# Patient Record
Sex: MALE | Race: WHITE | NOT HISPANIC OR LATINO | URBAN - METROPOLITAN AREA
[De-identification: names, ages, dates, MRNs, and addresses within clinical notes are randomized per-mention and may not be internally consistent; named-entity substitution may affect disease eponyms.]

---

## 2023-07-17 ENCOUNTER — OFFICE VISIT (OUTPATIENT)
Dept: PRIMARY CARE | Facility: CLINIC | Age: 33
End: 2023-07-17
Payer: COMMERCIAL

## 2023-07-17 VITALS
DIASTOLIC BLOOD PRESSURE: 86 MMHG | BODY MASS INDEX: 36.4 KG/M2 | SYSTOLIC BLOOD PRESSURE: 142 MMHG | HEIGHT: 74 IN | WEIGHT: 283.6 LBS | HEART RATE: 84 BPM

## 2023-07-17 DIAGNOSIS — Z00.00 HEALTHCARE MAINTENANCE: Primary | ICD-10-CM

## 2023-07-17 DIAGNOSIS — K52.9 CHRONIC DIARRHEA: ICD-10-CM

## 2023-07-17 DIAGNOSIS — R03.0 ELEVATED BLOOD PRESSURE READING: ICD-10-CM

## 2023-07-17 PROBLEM — N52.9 MALE ERECTILE DISORDER: Status: ACTIVE | Noted: 2023-07-17

## 2023-07-17 PROBLEM — R53.83 FATIGUE: Status: ACTIVE | Noted: 2023-07-17

## 2023-07-17 PROCEDURE — 99203 OFFICE O/P NEW LOW 30 MIN: CPT | Performed by: STUDENT IN AN ORGANIZED HEALTH CARE EDUCATION/TRAINING PROGRAM

## 2023-07-17 PROCEDURE — 99385 PREV VISIT NEW AGE 18-39: CPT | Performed by: STUDENT IN AN ORGANIZED HEALTH CARE EDUCATION/TRAINING PROGRAM

## 2023-07-17 NOTE — PROGRESS NOTES
"Subjective   Patient ID: Homar Blackman is a 33 y.o. male who presents for Chronic Diarrhea.  Today he is accompanied by alone.     HPI  1. Encounter for preventive health examination  Overall patient is doing well.   Immunization: Tdap 2020   COVID-19 vaccine (Pfizer) #1: 2021 #2: 2021  Colon Cancer Screening: No family history  Diet: healthy  Exercise: 5-6 times /week  Tobacco: Chews tobacco. Started again 1 year ago.  EtOH: Rarely Socially    Denies any chest pain, palpitations, shortness of breath, cough, nausea, vomiting, or any other acute illnesses/complaints     2. Hx of Elevated blood pressure reading  /86 today  Hx of blood pressure 160/90 and repeat of 152/80  Family History of heart disease in his father  Denies any cardiopulmonary symptoms    3. Chronic diarrhea ddx: IBS  Present for 2-3yrs. Occurs in the morning. Associated with cramping but no overt stomach pains.   Pt has tried changes in diet, fiber, probiotics  No family history of IBS or IBD. No hx of CRC.   Denies solid stools and constipation.  Denies Nausea and vomiting.  Does not associate diarrhea with lactose intolerance.  Has not tried gluten elimination.      No current outpatient medications on file prior to visit.     No current facility-administered medications on file prior to visit.        No Known Allergies    Immunization History   Administered Date(s) Administered    Pfizer Purple Cap SARS-CoV-2 04/20/2021, 05/12/2021    Tdap 02/03/2020         Review of Systems  All pertinent positive symptoms are included in the history of present illness.  All other systems have been reviewed and are negative and noncontributory to this patient's current ailments.     Objective   /86 (BP Location: Left arm, Patient Position: Sitting, BP Cuff Size: Large adult)   Pulse 84   Ht 1.88 m (6' 2\")   Wt 129 kg (283 lb 9.6 oz)   BMI 36.41 kg/m²   BSA: 2.6 meters squared      Physical Exam  CONSTITUTIONAL - well nourished, well developed, " looks like stated age, in no acute distress, not ill-appearing, and not tired appearing  SKIN - normal skin color and pigmentation, normal skin turgor without rash, lesions, or nodules visualized  HEAD - no trauma, normocephalic  EYES - pupils are equal and reactive to light, extraocular muscles are intact, and normal external exam  ENT - TM's intact, no injection, no signs of infection, uvula midline, normal tongue movement and throat normal, no exudate, nasal passage without discharge and patent  NECK - supple without rigidity, no neck mass was observed,  CHEST - clear to auscultation, no wheezing, no crackles and no rales, good effort  CARDIAC - regular rate and regular rhythm, no skipped beats, no murmur  ABDOMEN - no organomegaly, soft, nontender, nondistended, normal bowel sounds, no guarding/rebound/rigidity  EXTREMITIES - no edema, no deformities  NEUROLOGICAL - normal gait, normal balance, normal motor, no ataxia; alert, oriented and no focal signs  PSYCHIATRIC - alert, pleasant and cordial, age-appropriate  IMMUNOLOGIC - no cervical lymphadenopathy     Assessment/Plan   1. Encounter for preventive health examination  Complete history and physical examination was performed  Requisition for fasting blood work was given to you today. Please follow up with any  lab at your convenience and we will reach out to you with recommendations and results.     2. Hx of Elevated blood pressure reading  /86 today  Please take Blood pressure readings at home for the next week and reach out if higher than 120/80 for multiple days.    3. Chronic diarrhea ddx: IBS  We have send blood work and stool studies to determine etiology of chronic diarrhea.   We have also given you a requisition for gastroenterologist, please reach out to them as well and make an appointment at your convenience.   We will reach out with lab test results as they come back.   We will continue to follow.  Please reach out to our office or the  emergency dept if stool becomes bloody or if you have symptoms of light headedness.    Thank you for allowing us to be apart of your care team.

## 2023-07-27 ENCOUNTER — LAB (OUTPATIENT)
Dept: LAB | Facility: LAB | Age: 33
End: 2023-07-27
Payer: COMMERCIAL

## 2023-07-27 DIAGNOSIS — K52.9 CHRONIC DIARRHEA: ICD-10-CM

## 2023-07-27 DIAGNOSIS — Z00.00 HEALTHCARE MAINTENANCE: ICD-10-CM

## 2023-07-27 LAB
ALANINE AMINOTRANSFERASE (SGPT) (U/L) IN SER/PLAS: 21 U/L (ref 10–52)
ALBUMIN (G/DL) IN SER/PLAS: 4.2 G/DL (ref 3.4–5)
ALKALINE PHOSPHATASE (U/L) IN SER/PLAS: 67 U/L (ref 33–120)
ANION GAP IN SER/PLAS: 12 MMOL/L (ref 10–20)
ASPARTATE AMINOTRANSFERASE (SGOT) (U/L) IN SER/PLAS: 17 U/L (ref 9–39)
BASOPHILS (10*3/UL) IN BLOOD BY AUTOMATED COUNT: 0.08 X10E9/L (ref 0–0.1)
BASOPHILS/100 LEUKOCYTES IN BLOOD BY AUTOMATED COUNT: 0.8 % (ref 0–2)
BILIRUBIN TOTAL (MG/DL) IN SER/PLAS: 0.6 MG/DL (ref 0–1.2)
CALCIUM (MG/DL) IN SER/PLAS: 8.9 MG/DL (ref 8.6–10.3)
CARBON DIOXIDE, TOTAL (MMOL/L) IN SER/PLAS: 25 MMOL/L (ref 21–32)
CHLORIDE (MMOL/L) IN SER/PLAS: 109 MMOL/L (ref 98–107)
CHOLESTEROL (MG/DL) IN SER/PLAS: 166 MG/DL (ref 0–199)
CHOLESTEROL IN HDL (MG/DL) IN SER/PLAS: 42.5 MG/DL
CHOLESTEROL/HDL RATIO: 3.9
CREATININE (MG/DL) IN SER/PLAS: 1.16 MG/DL (ref 0.5–1.3)
DEAMIDATED GLIADIN PEPTIDE IGA: 3 U/ML (ref 0–14)
DEAMIDATED GLIADIN PEPTIDE IGG: <1 U/ML (ref 0–14)
EOSINOPHILS (10*3/UL) IN BLOOD BY AUTOMATED COUNT: 0.27 X10E9/L (ref 0–0.7)
EOSINOPHILS/100 LEUKOCYTES IN BLOOD BY AUTOMATED COUNT: 2.5 % (ref 0–6)
ERYTHROCYTE DISTRIBUTION WIDTH (RATIO) BY AUTOMATED COUNT: 13.3 % (ref 11.5–14.5)
ERYTHROCYTE MEAN CORPUSCULAR HEMOGLOBIN CONCENTRATION (G/DL) BY AUTOMATED: 33 G/DL (ref 32–36)
ERYTHROCYTE MEAN CORPUSCULAR VOLUME (FL) BY AUTOMATED COUNT: 94 FL (ref 80–100)
ERYTHROCYTES (10*6/UL) IN BLOOD BY AUTOMATED COUNT: 4.83 X10E12/L (ref 4.5–5.9)
ESTIMATED AVERAGE GLUCOSE FOR HBA1C: 114 MG/DL
GFR MALE: 85 ML/MIN/1.73M2
GLUCOSE (MG/DL) IN SER/PLAS: 89 MG/DL (ref 74–99)
HEMATOCRIT (%) IN BLOOD BY AUTOMATED COUNT: 45.4 % (ref 41–52)
HEMOGLOBIN (G/DL) IN BLOOD: 15 G/DL (ref 13.5–17.5)
HEMOGLOBIN A1C/HEMOGLOBIN TOTAL IN BLOOD: 5.6 %
IMMATURE GRANULOCYTES/100 LEUKOCYTES IN BLOOD BY AUTOMATED COUNT: 0.4 % (ref 0–0.9)
LDL: 105 MG/DL (ref 0–99)
LEUKOCYTES (10*3/UL) IN BLOOD BY AUTOMATED COUNT: 10.7 X10E9/L (ref 4.4–11.3)
LYMPHOCYTES (10*3/UL) IN BLOOD BY AUTOMATED COUNT: 2.01 X10E9/L (ref 1.2–4.8)
LYMPHOCYTES/100 LEUKOCYTES IN BLOOD BY AUTOMATED COUNT: 18.9 % (ref 13–44)
MONOCYTES (10*3/UL) IN BLOOD BY AUTOMATED COUNT: 0.71 X10E9/L (ref 0.1–1)
MONOCYTES/100 LEUKOCYTES IN BLOOD BY AUTOMATED COUNT: 6.7 % (ref 2–10)
NEUTROPHILS (10*3/UL) IN BLOOD BY AUTOMATED COUNT: 7.54 X10E9/L (ref 1.2–7.7)
NEUTROPHILS/100 LEUKOCYTES IN BLOOD BY AUTOMATED COUNT: 70.7 % (ref 40–80)
PLATELETS (10*3/UL) IN BLOOD AUTOMATED COUNT: 302 X10E9/L (ref 150–450)
POTASSIUM (MMOL/L) IN SER/PLAS: 4.5 MMOL/L (ref 3.5–5.3)
PROTEIN TOTAL: 7.1 G/DL (ref 6.4–8.2)
SODIUM (MMOL/L) IN SER/PLAS: 141 MMOL/L (ref 136–145)
THYROTROPIN (MIU/L) IN SER/PLAS BY DETECTION LIMIT <= 0.05 MIU/L: 1.52 MIU/L (ref 0.44–3.98)
TISSUE TRANSGLUTAMINASE IGG: <1 U/ML (ref 0–14)
TISSUE TRANSGLUTAMINASE, IGA: <1 U/ML (ref 0–14)
TRIGLYCERIDE (MG/DL) IN SER/PLAS: 94 MG/DL (ref 0–149)
UREA NITROGEN (MG/DL) IN SER/PLAS: 14 MG/DL (ref 6–23)
VLDL: 19 MG/DL (ref 0–40)

## 2023-07-27 PROCEDURE — 83036 HEMOGLOBIN GLYCOSYLATED A1C: CPT

## 2023-07-27 PROCEDURE — 85025 COMPLETE CBC W/AUTO DIFF WBC: CPT

## 2023-07-27 PROCEDURE — 83516 IMMUNOASSAY NONANTIBODY: CPT

## 2023-07-27 PROCEDURE — 83993 ASSAY FOR CALPROTECTIN FECAL: CPT

## 2023-07-27 PROCEDURE — 36415 COLL VENOUS BLD VENIPUNCTURE: CPT

## 2023-07-27 PROCEDURE — 80061 LIPID PANEL: CPT

## 2023-07-27 PROCEDURE — 80053 COMPREHEN METABOLIC PANEL: CPT

## 2023-07-27 PROCEDURE — 87177 OVA AND PARASITES SMEARS: CPT

## 2023-07-27 PROCEDURE — 87328 CRYPTOSPORIDIUM AG IA: CPT

## 2023-07-27 PROCEDURE — 84443 ASSAY THYROID STIM HORMONE: CPT

## 2023-07-27 PROCEDURE — 87329 GIARDIA AG IA: CPT

## 2023-07-31 ENCOUNTER — TELEPHONE (OUTPATIENT)
Dept: PRIMARY CARE | Facility: CLINIC | Age: 33
End: 2023-07-31
Payer: COMMERCIAL

## 2023-07-31 NOTE — RESULT ENCOUNTER NOTE
Cholesterol noted much improvement to 166, HDL 42, , triglycerides 94    Sugar, kidneys, liver, electrolytes are all within normal limits    Thyroid within normal limits    Celiac panel noted to be within normal limits    Hemoglobin A1c well within normal limits at 5.6%    Complete blood cell count shows no anemia    We are still waiting for the ova and parasite as well as the calprotectin fecal at this time    Otherwise, I would recommend that he follows up with gastroenterology to make sure we are not missing anything

## 2023-07-31 NOTE — TELEPHONE ENCOUNTER
----- Message from Zachariah Osei DO sent at 7/31/2023  6:28 AM EDT -----  Cholesterol noted much improvement to 166, HDL 42, , triglycerides 94    Sugar, kidneys, liver, electrolytes are all within normal limits    Thyroid within normal limits    Celiac panel noted to be within normal limits    Hemoglobin A1c well within normal limits at 5.6%    Complete blood cell count shows no anemia    We are still waiting for the ova and parasite as well as the calprotectin fecal at this time    Otherwise, I would recommend that he follows up with gastroenterology to make sure we are not missing anything

## 2023-08-01 LAB — CALPROTECTIN, STOOL: 881 UG/G

## 2023-08-03 ENCOUNTER — TELEPHONE (OUTPATIENT)
Dept: PRIMARY CARE | Facility: CLINIC | Age: 33
End: 2023-08-03
Payer: COMMERCIAL

## 2023-08-03 DIAGNOSIS — R19.5 ELEVATED FECAL CALPROTECTIN: ICD-10-CM

## 2023-08-03 LAB
CRYPTOSPORIDIUM ANTIGEN-DATA CONVERSION: NEGATIVE
GIARDIA LAMBLIA AG-DATA CONVERSION: NEGATIVE
OVA + PARASITE EXAM: NEGATIVE

## 2023-08-03 NOTE — RESULT ENCOUNTER NOTE
Parasite is negative as well as Giardia/crypto    I would recommend he follows up with gastroenterology as discussed earlier

## 2023-08-03 NOTE — TELEPHONE ENCOUNTER
----- Message from Zachariah Osei DO sent at 8/3/2023  1:33 PM EDT -----  Parasite is negative as well as Giardia/crypto    I would recommend he follows up with gastroenterology as discussed earlier

## 2023-08-03 NOTE — RESULT ENCOUNTER NOTE
Calprotectin stool was very elevated at 881  This is pointing towards inflammation that is within the stool    Giardia/crypto is negative    Due to this elevated value, the neck step would be following up with a gastroenterologist and even possibly discuss a possible colonoscopy to make sure there is nothing that we are missing or even further testing  I would recommend he follows up at his earliest mutual convenience

## 2023-08-03 NOTE — TELEPHONE ENCOUNTER
----- Message from Zachariah Osei DO sent at 8/3/2023  6:42 AM EDT -----  Calprotectin stool was very elevated at 881  This is pointing towards inflammation that is within the stool    Giardia/crypto is negative    Due to this elevated value, the neck step would be following up with a gastroenterologist and even possibly discuss a possible colonoscopy to make sure there is nothing that we are missing or even further testing  I would recommend he follows up at his earliest mutual convenience

## 2023-12-06 ENCOUNTER — SPECIALTY PHARMACY (OUTPATIENT)
Dept: PHARMACY | Facility: CLINIC | Age: 33
End: 2023-12-06

## 2023-12-06 ENCOUNTER — LAB (OUTPATIENT)
Dept: LAB | Facility: LAB | Age: 33
End: 2023-12-06
Payer: COMMERCIAL

## 2023-12-06 ENCOUNTER — OFFICE VISIT (OUTPATIENT)
Dept: GASTROENTEROLOGY | Facility: CLINIC | Age: 33
End: 2023-12-06
Payer: COMMERCIAL

## 2023-12-06 VITALS — HEIGHT: 74 IN | WEIGHT: 288 LBS | BODY MASS INDEX: 36.96 KG/M2

## 2023-12-06 DIAGNOSIS — K51.00 ULCERATIVE PANCOLITIS WITHOUT COMPLICATION (MULTI): Primary | ICD-10-CM

## 2023-12-06 DIAGNOSIS — K51.00 ULCERATIVE PANCOLITIS WITHOUT COMPLICATION (MULTI): ICD-10-CM

## 2023-12-06 LAB
HAV IGM SER QL: NONREACTIVE
HBV CORE AB SER QL: NONREACTIVE
HBV CORE IGM SER QL: NONREACTIVE
HBV SURFACE AB SER-ACNC: 10.4 MIU/ML
HBV SURFACE AG SERPL QL IA: NONREACTIVE
HCV AB SER QL: NONREACTIVE

## 2023-12-06 PROCEDURE — 86704 HEP B CORE ANTIBODY TOTAL: CPT

## 2023-12-06 PROCEDURE — 80074 ACUTE HEPATITIS PANEL: CPT

## 2023-12-06 PROCEDURE — 86481 TB AG RESPONSE T-CELL SUSP: CPT

## 2023-12-06 PROCEDURE — 86706 HEP B SURFACE ANTIBODY: CPT

## 2023-12-06 PROCEDURE — 36415 COLL VENOUS BLD VENIPUNCTURE: CPT

## 2023-12-06 PROCEDURE — 99214 OFFICE O/P EST MOD 30 MIN: CPT

## 2023-12-06 RX ORDER — MESALAMINE 1.2 G/1
TABLET, DELAYED RELEASE ORAL
COMMUNITY
Start: 2023-10-29 | End: 2023-12-06 | Stop reason: WASHOUT

## 2023-12-06 ASSESSMENT — ENCOUNTER SYMPTOMS
SHORTNESS OF BREATH: 0
APPETITE CHANGE: 0
VOMITING: 0
ABDOMINAL PAIN: 0
RECTAL PAIN: 0
DIARRHEA: 1
TROUBLE SWALLOWING: 0
COUGH: 0
ABDOMINAL DISTENTION: 0
CONSTIPATION: 0
FEVER: 0
FATIGUE: 0
CHILLS: 0
BLOOD IN STOOL: 1
ANAL BLEEDING: 0
NAUSEA: 0

## 2023-12-06 NOTE — PATIENT INSTRUCTIONS
Please get your labs required for new treatment  We will start Zeposia medication since mesamine is not working at all.  This was sent to  Specialty pharmacy. They will reach out to you  Follow up in 3 months or as needed

## 2023-12-06 NOTE — PROGRESS NOTES
Subjective     History of Present Illness:   Homar Blackman is a 33 y.o. male with PMHx of chronic diarrhea, ulcerative pancolitis who presents to GI clinic for further evaluation uncontrolled ulcerative colitis    Today, patient was taking mesalamine 4 pills daily.  Ran out a few weeks ago.  Symptoms never improved at all with mesalamine.  Prednisone helped initially. Moving bowels 3-4 times daily with liquid-loose diarrhea.   Denies constipation, diarrhea, dyspepsia, melena, hematochezia, dysphagia, unintentional weight loss    Social ETOH, smoking,  chews tobacco, denies marijuana  Denies fxh GI cancer or IBD  Abdominal Surgeries: denies     Last colonoscopy 8/2023 Dr. Packer: Congested erythematous eroded and granular mucosa entire colon, hemorrhoids.  Consistent with mild to moderate ulcerative pancolitis.  Biopsies: Active chronic colitis.  Prescribed prednisone and mesalamine 4.8 g daily  Last EGD   7/2023 fecal calprotectin 881  Past Medical History  As per HPI.     Social History  he  reports that he has never smoked. His smokeless tobacco use includes chew.     Family History  his family history is not on file.     Review of Systems  Review of Systems   Constitutional:  Negative for appetite change, chills, fatigue and fever.   HENT:  Negative for trouble swallowing.    Respiratory:  Negative for cough and shortness of breath.    Gastrointestinal:  Positive for blood in stool and diarrhea. Negative for abdominal distention, abdominal pain, anal bleeding, constipation, nausea, rectal pain and vomiting.       Allergies  No Known Allergies    Medications  Current Outpatient Medications   Medication Instructions    Study [id number] ozanimod HCL 0.25 mg capsule (titration) Take one (0.25 mg) capsule ONCE DAILY on days 1 thru 4 from 5  to 7 then take 2 x 0.25mg ( 0.5mg) dose on days 5 thru 7 and then start maintenance dosing    Study [id number] ozanimod HCL 1 mg, oral, Daily, If a dose is missed during  the first 14 days of treatment, reinitiate dose escalation regimen. If a dose is missed after the first 14 days of treatment, continue with the regimen as planned.        Objective   There were no vitals taken for this visit.   Physical Exam      Lab Results   Component Value Date    WBC 10.7 07/27/2023    WBC 7.0 02/04/2020    HGB 15.0 07/27/2023    HGB 16.2 02/04/2020    HCT 45.4 07/27/2023    HCT 49.8 02/04/2020     07/27/2023     02/04/2020     Lab Results   Component Value Date     07/27/2023     02/04/2020    K 4.5 07/27/2023    K 4.6 02/04/2020     (H) 07/27/2023     02/04/2020    CO2 25 07/27/2023    CO2 29 02/04/2020    BUN 14 07/27/2023    BUN 15 02/04/2020    CREATININE 1.16 07/27/2023    CREATININE 0.95 02/04/2020    CALCIUM 8.9 07/27/2023    CALCIUM 9.6 02/04/2020    PROT 7.1 07/27/2023    PROT 7.0 02/04/2020    BILITOT 0.6 07/27/2023    BILITOT 0.5 02/04/2020    ALKPHOS 67 07/27/2023    ALKPHOS 72 02/04/2020    ALT 21 07/27/2023    ALT 27 02/04/2020    AST 17 07/27/2023    AST 25 02/04/2020    GLUCOSE 89 07/27/2023    GLUCOSE 100 (H) 02/04/2020           Homar Blackman is a 33 y.o. male who presents to GI clinic for unmanaged ulcerative colitis.    Ulcerative pancolitis without complication (CMS/HCC)  Moderate pancolitis ulcerative colitis  -Failed mesalamine  -Start Zeposia-EKG, TB, and hepatitis labs ordered  -Follow-up in 3 months         TERESA Brooke-CNP

## 2023-12-06 NOTE — ASSESSMENT & PLAN NOTE
Moderate pancolitis ulcerative colitis  -Failed mesalamine  -Start Zeposia-EKG, TB, and hepatitis labs ordered  -Follow-up in 3 months

## 2023-12-08 LAB
NIL(NEG) CONTROL SPOT COUNT: NORMAL
PANEL A SPOT COUNT: 1
PANEL B SPOT COUNT: 0
POS CONTROL SPOT COUNT: NORMAL
T-SPOT. TB INTERPRETATION: NEGATIVE

## 2023-12-12 DIAGNOSIS — K51.00 ULCERATIVE PANCOLITIS WITHOUT COMPLICATION (MULTI): Primary | ICD-10-CM

## 2023-12-13 DIAGNOSIS — K51.00 ULCERATIVE PANCOLITIS WITHOUT COMPLICATION (MULTI): Primary | ICD-10-CM

## 2023-12-13 RX ORDER — HEPARIN SODIUM,PORCINE/PF 10 UNIT/ML
50 SYRINGE (ML) INTRAVENOUS AS NEEDED
Status: CANCELLED | OUTPATIENT
Start: 2023-12-13

## 2023-12-13 RX ORDER — HEPARIN 100 UNIT/ML
500 SYRINGE INTRAVENOUS AS NEEDED
Status: CANCELLED | OUTPATIENT
Start: 2023-12-13

## 2023-12-13 RX ORDER — DIPHENHYDRAMINE HYDROCHLORIDE 50 MG/ML
50 INJECTION INTRAMUSCULAR; INTRAVENOUS AS NEEDED
Status: CANCELLED | OUTPATIENT
Start: 2023-12-13

## 2023-12-13 RX ORDER — ALBUTEROL SULFATE 0.83 MG/ML
3 SOLUTION RESPIRATORY (INHALATION) AS NEEDED
Status: CANCELLED | OUTPATIENT
Start: 2023-12-13

## 2023-12-13 RX ORDER — FAMOTIDINE 10 MG/ML
20 INJECTION INTRAVENOUS ONCE AS NEEDED
Status: CANCELLED | OUTPATIENT
Start: 2023-12-13

## 2023-12-13 RX ORDER — EPINEPHRINE 0.3 MG/.3ML
0.3 INJECTION SUBCUTANEOUS EVERY 5 MIN PRN
Status: CANCELLED | OUTPATIENT
Start: 2023-12-13

## 2023-12-14 ENCOUNTER — DOCUMENTATION (OUTPATIENT)
Dept: INFUSION THERAPY | Facility: CLINIC | Age: 33
End: 2023-12-14

## 2023-12-14 NOTE — PROGRESS NOTES
"CLINICAL CLEARANCE FOR OP INFUSION    Patient to be scheduled for New Start of Stelara infusions  For Diagnosis: Crohns Disease    Dosing is weight based at:   <=55 k mg as single dose  >55 kg to 85 k mg as single dose  >85 k mg as single dose    Using a Dosing Weight of: 131 kg   For a total Dose of: 520 mg     Labs…  T-Spot Drawn/Results:   Lab Results   Component Value Date    TBSIN Negative 2023      Hep B SAg Drawn/Results:   Lab Results   Component Value Date    HEPBSAG Nonreactive 2023          CBC drawn:  (if available)   Lab Results   Component Value Date    WBC 10.7 2023    HGB 15.0 2023    HCT 45.4 2023    MCV 94 2023     2023      CRP drawn:  (if available) No results found for: \"CRP\"     Transitioning from another drug? No (appropriate lapse in time b/t last drug and stelara)    This result meets treatment criteria.  Pt Is schedueld 24  "

## 2023-12-15 RX ORDER — EPINEPHRINE 0.3 MG/.3ML
0.3 INJECTION SUBCUTANEOUS EVERY 5 MIN PRN
Status: CANCELLED | OUTPATIENT
Start: 2023-12-15

## 2023-12-15 RX ORDER — DIPHENHYDRAMINE HYDROCHLORIDE 50 MG/ML
50 INJECTION INTRAMUSCULAR; INTRAVENOUS AS NEEDED
Status: CANCELLED | OUTPATIENT
Start: 2023-12-15

## 2023-12-15 RX ORDER — FAMOTIDINE 10 MG/ML
20 INJECTION INTRAVENOUS ONCE AS NEEDED
Status: CANCELLED | OUTPATIENT
Start: 2023-12-15

## 2023-12-15 RX ORDER — ALBUTEROL SULFATE 0.83 MG/ML
3 SOLUTION RESPIRATORY (INHALATION) AS NEEDED
Status: CANCELLED | OUTPATIENT
Start: 2023-12-15

## 2023-12-21 ENCOUNTER — SPECIALTY PHARMACY (OUTPATIENT)
Dept: PHARMACY | Facility: CLINIC | Age: 33
End: 2023-12-21

## 2023-12-21 PROCEDURE — RXMED WILLOW AMBULATORY MEDICATION CHARGE

## 2023-12-27 ENCOUNTER — SPECIALTY PHARMACY (OUTPATIENT)
Dept: PHARMACY | Facility: CLINIC | Age: 33
End: 2023-12-27

## 2024-01-05 ENCOUNTER — PHARMACY VISIT (OUTPATIENT)
Dept: PHARMACY | Facility: CLINIC | Age: 34
End: 2024-01-05
Payer: COMMERCIAL

## 2024-01-09 ENCOUNTER — SPECIALTY PHARMACY (OUTPATIENT)
Dept: PHARMACY | Facility: CLINIC | Age: 34
End: 2024-01-09

## 2024-01-09 NOTE — PROGRESS NOTES
[facility] Clinical Note    Homar Blackman is a 33 y.o. male, who is on the specialty pharmacy service for management of: Gastroenterology Core with status of: (Enrolled)     Homar was contacted on 1/9/2024.    Refer to the encounter summary report for documentation details about patient counseling and education.      Medication Adherence  The importance of adherence was discussed with the patient and they were advised to take the medication as prescribed by their provider. Homar was encouraged to call his physician's office if they have a question regarding a missed dose.        Patient advised to contact the pharmacy if there are any changes to her medication list, including prescriptions, OTC medications, herbal products, or supplements. Patient was advised of [facility]’s dispensing process, refill timeline, contact information ([phone]), and patient management follow up. Patient confirmed understanding of education conducted during assessment. All patient questions and concerns were addressed to the best of my ability. Patient was encouraged to contact the specialty pharmacy with any questions or concerns.    Confirmed follow-up outreaches are properly scheduled. Reviewed goals of therapy in the program targets.    Denise Kilpatrick, PharmD

## 2024-01-10 RX ORDER — HEPARIN SODIUM,PORCINE/PF 10 UNIT/ML
50 SYRINGE (ML) INTRAVENOUS AS NEEDED
OUTPATIENT
Start: 2024-01-10

## 2024-01-10 RX ORDER — HEPARIN 100 UNIT/ML
500 SYRINGE INTRAVENOUS AS NEEDED
OUTPATIENT
Start: 2024-01-10

## 2024-02-08 ENCOUNTER — TELEPHONE (OUTPATIENT)
Dept: GASTROENTEROLOGY | Facility: CLINIC | Age: 34
End: 2024-02-08
Payer: COMMERCIAL

## 2024-02-09 ENCOUNTER — APPOINTMENT (OUTPATIENT)
Dept: INFUSION THERAPY | Facility: CLINIC | Age: 34
End: 2024-02-09
Payer: COMMERCIAL

## 2024-02-09 DIAGNOSIS — K51.00 ULCERATIVE PANCOLITIS WITHOUT COMPLICATION (MULTI): ICD-10-CM

## 2024-02-09 RX ORDER — HEPARIN 100 UNIT/ML
500 SYRINGE INTRAVENOUS AS NEEDED
OUTPATIENT
Start: 2024-02-09

## 2024-02-09 RX ORDER — HEPARIN SODIUM,PORCINE/PF 10 UNIT/ML
50 SYRINGE (ML) INTRAVENOUS AS NEEDED
OUTPATIENT
Start: 2024-02-09

## 2024-02-13 DIAGNOSIS — K51.00 ULCERATIVE PANCOLITIS WITHOUT COMPLICATION (MULTI): Primary | ICD-10-CM

## 2024-02-13 RX ORDER — DIPHENHYDRAMINE HYDROCHLORIDE 50 MG/ML
50 INJECTION INTRAMUSCULAR; INTRAVENOUS AS NEEDED
Status: CANCELLED | OUTPATIENT
Start: 2024-02-13

## 2024-02-13 RX ORDER — ALBUTEROL SULFATE 0.83 MG/ML
3 SOLUTION RESPIRATORY (INHALATION) AS NEEDED
Status: CANCELLED | OUTPATIENT
Start: 2024-02-13

## 2024-02-13 RX ORDER — EPINEPHRINE 0.3 MG/.3ML
0.3 INJECTION SUBCUTANEOUS EVERY 5 MIN PRN
Status: CANCELLED | OUTPATIENT
Start: 2024-02-13

## 2024-02-13 RX ORDER — FAMOTIDINE 10 MG/ML
20 INJECTION INTRAVENOUS ONCE AS NEEDED
Status: CANCELLED | OUTPATIENT
Start: 2024-02-13

## 2024-02-20 ENCOUNTER — SPECIALTY PHARMACY (OUTPATIENT)
Dept: PHARMACY | Facility: CLINIC | Age: 34
End: 2024-02-20

## 2024-02-23 ENCOUNTER — INFUSION (OUTPATIENT)
Dept: INFUSION THERAPY | Facility: CLINIC | Age: 34
End: 2024-02-23
Payer: COMMERCIAL

## 2024-02-23 VITALS
SYSTOLIC BLOOD PRESSURE: 127 MMHG | BODY MASS INDEX: 38.04 KG/M2 | DIASTOLIC BLOOD PRESSURE: 85 MMHG | RESPIRATION RATE: 18 BRPM | WEIGHT: 296.3 LBS | OXYGEN SATURATION: 97 % | HEART RATE: 64 BPM | TEMPERATURE: 97.7 F

## 2024-02-23 DIAGNOSIS — K51.00 ULCERATIVE PANCOLITIS WITHOUT COMPLICATION (MULTI): ICD-10-CM

## 2024-02-23 PROCEDURE — 96365 THER/PROPH/DIAG IV INF INIT: CPT | Performed by: NURSE PRACTITIONER

## 2024-02-23 RX ORDER — ALBUTEROL SULFATE 0.83 MG/ML
3 SOLUTION RESPIRATORY (INHALATION) AS NEEDED
OUTPATIENT
Start: 2024-02-23

## 2024-02-23 RX ORDER — DIPHENHYDRAMINE HYDROCHLORIDE 50 MG/ML
50 INJECTION INTRAMUSCULAR; INTRAVENOUS AS NEEDED
OUTPATIENT
Start: 2024-02-23

## 2024-02-23 RX ORDER — EPINEPHRINE 0.3 MG/.3ML
0.3 INJECTION SUBCUTANEOUS EVERY 5 MIN PRN
OUTPATIENT
Start: 2024-02-23

## 2024-02-23 RX ORDER — FAMOTIDINE 10 MG/ML
20 INJECTION INTRAVENOUS ONCE AS NEEDED
OUTPATIENT
Start: 2024-02-23

## 2024-02-23 ASSESSMENT — ENCOUNTER SYMPTOMS
ARTHRALGIAS: 0
DIZZINESS: 0
VOMITING: 0
SORE THROAT: 0
VOICE CHANGE: 0
PALPITATIONS: 0
EXTREMITY WEAKNESS: 0
TROUBLE SWALLOWING: 0
NAUSEA: 0
APPETITE CHANGE: 0
WOUND: 0
LEG SWELLING: 0
COUGH: 0
FEVER: 0
DIARRHEA: 1
CHILLS: 0
LIGHT-HEADEDNESS: 0
WHEEZING: 0
NUMBNESS: 0
SHORTNESS OF BREATH: 0
ABDOMINAL PAIN: 0
DEPRESSION: 0
CONSTIPATION: 0
HEMATURIA: 0
NERVOUS/ANXIOUS: 0
DYSURIA: 0
BLOOD IN STOOL: 0
EYE PROBLEMS: 0
UNEXPECTED WEIGHT CHANGE: 0
FREQUENCY: 0
FATIGUE: 0
HEADACHES: 0
MYALGIAS: 0

## 2024-02-23 NOTE — PROGRESS NOTES
[facility]   infusion Clinic Note   Date: 2024   Name: Homar Blackman  : 1990   MRN: [id number]         Reason for Visit: New Patient Visit and OP Infusion (PATIENT IS HERE FOR FIRST DOSE OF STELARA 520 MG INFUSION. WILL BEGIN INJECTIONS EVERY 8 WEEKS.)         Visit Type: INFUSION       Ordered By:  ELIZABETH ZALDIVAR      Accompanied by:Self      Diagnosis: Ulcerative pancolitis without complication (CMS/[facility])       Allergies:   Allergies as of 2024    (No Known Allergies)         Current Medications has a current medication list which includes the following prescription(s): ustekinumab, ustekinumab, and ustekinumab.       Vitals:   Vitals:    24 0945 24 1110 24 1218   BP: 146/89  Comment: nurse notified 124/84 127/85   Pulse: 78 74 64   Resp: 18 18 18   Temp: 36.6 °C (97.9 °F) 36.9 °C (98.4 °F) 36.5 °C (97.7 °F)   SpO2: 99% 98% 97%   Weight: 134 kg (296 lb 4.8 oz)               Infusion Pre-procedure Checklist:   - Allergies reviewed: yes   - Medications reviewed: yes       - Previous reaction to current treatment: n/a FIRST DOSE TODAY.      Assess patient for the concerns below. Document provider notification as appropriate.  - Active or recent infection with/without current antibiotic use: no  - Recent or planned invasive dental work: no  - Recent or planned surgeries: no  - Recently received or plans to receive vaccinations: no  - Has treatment related toxicities: n/a FIRST DOSE TODAY  - Is pregnant:  n/a      Pain: 0   - Is the pain different from normal: n/a   - Is the pain tolerable: n/a   - Is your Doctor aware:  n/a      Labs: N/A         Fall Risk Screening: Rochelle Fall Risk  History of Falling, Immediate or Within 3 Months: No  Secondary Diagnosis: No  Ambulatory Aid: Walks without aid/bedrest/nurse assist  Intravenous Therapy/Heparin Lock: Yes  Gait/Transferring: Normal/bedrest/immobile  Mental Status: Oriented to own ability  Rochelle  Fall Risk Score: 20       Review Of Systems:  Review of Systems   Constitutional:  Negative for appetite change, chills, fatigue, fever and unexpected weight change.   HENT:   Negative for hearing loss, mouth sores, sore throat, tinnitus, trouble swallowing and voice change.    Eyes:  Negative for eye problems.   Respiratory:  Negative for cough, shortness of breath and wheezing.    Cardiovascular:  Negative for chest pain, leg swelling and palpitations.   Gastrointestinal:  Positive for diarrhea. Negative for abdominal pain, blood in stool, constipation, nausea and vomiting.        PT WITH A DX OF IBD REPORTS #  4 LOOSE  BM'S PER DAY. denies NOTING BLOOD/MUCUS TO STOOLS.  reports C/O OF ABDOMINAL PAIN AND/OR BOUTS OF NOCTURNAL STOOLING (OCCASIONALLY).     Genitourinary:  Negative for dysuria, frequency and hematuria.    Musculoskeletal:  Negative for arthralgias and myalgias.   Skin:  Negative for itching, rash and wound.   Neurological:  Negative for dizziness, extremity weakness, headaches, light-headedness and numbness.   Psychiatric/Behavioral:  Negative for depression. The patient is not nervous/anxious.          Infusion Readiness:   - Assessment Concerns Related to Infusion: No  - Provider notified: n/a      Document Below Only If Indicated:   New Patient Education:    NEW PATIENT MEDICATION EDUCATION PT PROVIDED WITH WRITTEN ([facility] PT EDUCATION SHEET) AND VERBAL EDUCATION REGARDING MEDICATION GIVEN. VERIFIED MEDICATION NAME WITH PATIENT AND DISCUSSED REASON FOR USE. BRIEFLY DISCUSSED HOW MEDICATION WORKS AND EDUCATED ON GOAL OF TREATMENT, FREQUENCY OF TREATMENT, ADVERSE RXN'S AND COMMON SIDE EFFECTS TO MONITOR FOR. INSTRUCTED PT TO ASSURE THAT ALL PROVIDERS INCLUDING DENTISTS ARE AWARE OF MEDICATION RECEIVED. DISCUSSED FLOW OF VISIT AND ORIENTED TO INFUSION CENTER. PT VERBALIZES UNDERSTANDING. CALL LIGHT PROVIDED AND PT AWARE TO ALERT STAFF OF ANY CONCERNS DURING TREATMENT. YES        Treatment  "Conditions & Drug Specific Questions:    Ustekinumab  (STELARA)    (Unless otherwise specified on patient specific therapy plan):     TREATMENT CONDITIONS:  Unless otherwise specified on patient specific therapy plan HOLD and notify provider prior to proceeding with treatment if:   o Positive Hepatitis B Surface Ag  o Positive T-Spot  o Patient has signs or symptoms of Reversible Posterior Leukoencephalopathy Syndrome (RPLS)  o Patient has had recent live vaccination or lives with someone who recently had a live vaccine    Lab Results   Component Value Date    HEPBSAG Nonreactive 12/06/2023      Lab Results   Component Value Date    TBSIN Negative 12/06/2023      No results found for: \"NONUHFIRE\", \"NONUHSWGH\", \"NONUHFISH\", \"EXTHEPBSAG\"    Immunization History   Administered Date(s) Administered    Pfizer Purple Cap SARS-CoV-2 04/20/2021, 05/12/2021    Tdap vaccine, age 7 year and older (BOOSTRIX, ADACEL) 02/03/2020        Labs reviewed and patient meets treatment conditions? Yes    DRUG SPECIFIC QUESTIONS:  Does the patient have any signs or symptoms of Reversible Posterior Leukoencephalopathy Syndrome (RPLS) which may include: New or worsening headaches, Seizures, Confusion, Vision problems? No    Is the patient being treated for an infection or has open cuts?  No    Does the patient get frequent infections or has an infection that keeps coming back?  No    Has the patient been in close contact with anyone who has a known diagnosis of active TB?  No    Does the patient have a history of cancer?  No    Has the patient been in recent close contact with anyone who has received a live vaccination (Varicella, MMR, flu nasal spray)?  No    (If YES to any of the above HOLD and discuss with provider prior to proceeding)      REMINDER:  WEIGHT BASED DRUG     Recommended Vitals/Observation:  Vitals: Obtain vitals at start and end of infusion.  Observation: No observation.         Weight Based Drug Calculations:    WEIGHT " BASED DRUGS: Ustekinumab  (STELARA)   Patient's dosing weight (kg): 131 KG    10% weight variance for prescribed treatment: 117.9 kg to 144.1 kg     Patient's weight today:   Vitals:    02/23/24 0945   Weight: 134 kg (296 lb 4.8 oz)      Patient weight today falls outside of 10% variance or  weight range: No    [facility] pharmacist informed of weight variance: Not applicable    Doses that are weight based have an acceptable variance rule within 10% of the prescribed   order and/or within  weight range. If patient weight on day of infusion falls   outside of the 10% variance, or weight range, infusion is administered and   pharmacy contacted regarding future dosing adjustments, per policy.         Initiated By: Anastacia Ortega RN   Time: 12:40 PM     We administered ustekinumab (Stelara) 520 mg in sodium chloride 0.9% 250 mL IVPB.

## 2024-02-23 NOTE — PATIENT INSTRUCTIONS
Today :We administered ustekinumab (Stelara) 520 mg in sodium chloride 0.9% 250 mL IVPB.   INJECTION EDUCATION:  After the initial Stelara infusion, Stelara is given by you at home as a Subcutaneous injection every 8 weeks; begin maintenance injections 8 weeks after the IV infusion induction dose.    - Store Stelara in refrigerator.  - Take Stelara injection out of refrigerator and check dose is correct and medication is not   - Do not shake syringe!  - Gather needed supplies: alcohol pad, cotton ball or band aide, puncture resistant container ([facility] or can use pop bottle etc. dispose of per community guidelines once full)  - Wash hands with soap and water.  - Remove syringe from package by belly of syringe, inspect that liquid appears clear to light yellow in color. a few white particles may be noted and are normal. If liquid appears cloudy, discolored, frozen, is with large particles or the syringe is damaged do not use. Set aside.  - Pick injection site: buttocks, front of thigh, stomach (2 inches away from belly button), or outer area of upper arm . Rotate / change injection site each time you inject.  - Clean chosen injection site with alcohol pad and let dry.   -  syringe. Be sure not to shake or to depress plunger. Carefully remove needle cover from syringe by pulling cover straight off.  - Hold syringe between thumb and middle finger of one hand or anchor middle and pointer finger on the wings and pinch up area of skin at chosen injection site with the other. Insert needle at 45 degree angle with a quick, dart like motion.  - Push plunger head all the way down with index finger or thumb depending on chosen way of holding syringe.  - Remove needle with thumb still on plunger and release plunger to allow protective cover to come down and cover needle.  - Place used syringe in puncture resistant container.      For:   1. Ulcerative pancolitis without  complication (CMS/HCC)         Your next appointment is due in:  NO FURTHER INFUSION APPOINTMENTS. WILL BEGIN STELARA INJECTIONS AT HOME IN 8 WEEKS.       Please read the  Medication Guide that was given to you and reviewed during todays visit.     (Tell all doctors including dentists that you are taking this medication)     Go to the emergency room or call 911 if:  -You have signs of allergic reaction:   -Rash, hives, itching.   -Swollen, blistered, peeling skin.   -Swelling of face, lips, mouth, tongue or throat.   -Tightness of chest, trouble breathing, swallowing or talking     Call your doctor:  - If IV / injection site gets red, warm, swollen, itchy or leaks fluid or pus.     (Leave dressing on your IV site for at least 2 hours and keep area clean and dry  - If you get sick or have symptoms of infection or are not feeling well for any reason.    (Wash your hands often, stay away from people who are sick)  - If you have side effects from your medication that do not go away or are bothersome.     (Refer to the teaching your nurse gave you for side effects to call your doctor about)    - Common side effects may include:  stuffy nose, headache, feeling tired, muscle aches, upset stomach  - Before receiving any vaccines     - Call the [facility] at   If:  - You get sick, are on antibiotics, have had a recent vaccine, have surgery or dental work and your doctor wants your visit rescheduled.  - You need to cancel and reschedule your visit for any reason. Call at least 2 days before your visit if you need to cancel.   - Your insurance changes before your next visit.    (We will need to get approval from your new insurance. This can take up to two weeks.)     The [facility] is opened Monday thru Friday. We are closed on weekends and holidays.   Voice mail will take your call if the center is closed. If you leave a message please allow 24 hours for a call back during weekdays. If you  leave a message on a weekend/holiday, we will call you back the next business day.

## 2024-03-07 ENCOUNTER — TELEPHONE (OUTPATIENT)
Dept: GASTROENTEROLOGY | Facility: CLINIC | Age: 34
End: 2024-03-07
Payer: COMMERCIAL

## 2024-03-07 ENCOUNTER — SPECIALTY PHARMACY (OUTPATIENT)
Dept: PHARMACY | Facility: CLINIC | Age: 34
End: 2024-03-07

## 2024-04-02 ENCOUNTER — TELEPHONE (OUTPATIENT)
Dept: GASTROENTEROLOGY | Facility: CLINIC | Age: 34
End: 2024-04-02
Payer: COMMERCIAL

## 2024-04-04 ENCOUNTER — SPECIALTY PHARMACY (OUTPATIENT)
Dept: PHARMACY | Facility: CLINIC | Age: 34
End: 2024-04-04

## 2024-04-04 PROCEDURE — RXMED WILLOW AMBULATORY MEDICATION CHARGE

## 2024-04-08 ENCOUNTER — PHARMACY VISIT (OUTPATIENT)
Dept: PHARMACY | Facility: CLINIC | Age: 34
End: 2024-04-08
Payer: COMMERCIAL

## 2024-04-24 ENCOUNTER — SPECIALTY PHARMACY (OUTPATIENT)
Dept: PHARMACY | Facility: CLINIC | Age: 34
End: 2024-04-24

## 2024-04-24 NOTE — PROGRESS NOTES
[facility] Clinical Note    Homar Blackman is a 33 y.o. male, who is on the specialty pharmacy service of: Gastroenterology Core.  Homar Blackman is taking: Stelara.     Homar was contacted on 4/24/2024.    Refer to the encounter summary report for documentation details about patient counseling and education.      Impression/Plan  Is patient high risk? (potential patients:  pregnancy, geriatric, pediatric) No    Is laboratory follow-up needed? No  Is a clinical intervention needed?  No  Next assessment date?  ~10/15/2024  Additional comments:    Medication Adherence  The importance of adherence was discussed with the patient and they were advised to take the medication as prescribed by their provider. Homar was encouraged to call his physician's office if they have a question regarding a missed dose.        Patient advised to contact the pharmacy if there are any changes to her medication list, including prescriptions, OTC medications, herbal products, or supplements. Patient was advised of [facility]’s dispensing process, refill timeline, contact information ([phone]), and patient management follow up. Patient confirmed understanding of education conducted during assessment. All patient questions and concerns were addressed to the best of my ability. Patient was encouraged to contact the specialty pharmacy with any questions or concerns.    Confirmed follow-up outreaches are properly scheduled. Reviewed goals of therapy in the program targets.    Denise Kilpatrick, PharmD

## 2024-05-13 ENCOUNTER — OFFICE VISIT (OUTPATIENT)
Dept: GASTROENTEROLOGY | Facility: CLINIC | Age: 34
End: 2024-05-13
Payer: COMMERCIAL

## 2024-05-13 ENCOUNTER — LAB (OUTPATIENT)
Dept: LAB | Facility: LAB | Age: 34
End: 2024-05-13
Payer: COMMERCIAL

## 2024-05-13 VITALS — HEART RATE: 80 BPM | HEIGHT: 74 IN | WEIGHT: 300 LBS | BODY MASS INDEX: 38.5 KG/M2 | OXYGEN SATURATION: 97 %

## 2024-05-13 DIAGNOSIS — K51.00 ULCERATIVE PANCOLITIS WITHOUT COMPLICATION (MULTI): ICD-10-CM

## 2024-05-13 DIAGNOSIS — K51.00 ULCERATIVE PANCOLITIS WITHOUT COMPLICATION (MULTI): Primary | ICD-10-CM

## 2024-05-13 LAB
ERYTHROCYTE [DISTWIDTH] IN BLOOD BY AUTOMATED COUNT: 13.9 % (ref 11.5–14.5)
ERYTHROCYTE [SEDIMENTATION RATE] IN BLOOD BY WESTERGREN METHOD: 6 MM/H (ref 0–15)
HCT VFR BLD AUTO: 49.4 % (ref 41–52)
HGB BLD-MCNC: 16.4 G/DL (ref 13.5–17.5)
MCH RBC QN AUTO: 32.4 PG (ref 26–34)
MCHC RBC AUTO-ENTMCNC: 33.2 G/DL (ref 32–36)
MCV RBC AUTO: 98 FL (ref 80–100)
NRBC BLD-RTO: 0 /100 WBCS (ref 0–0)
PLATELET # BLD AUTO: 248 X10*3/UL (ref 150–450)
RBC # BLD AUTO: 5.06 X10*6/UL (ref 4.5–5.9)
WBC # BLD AUTO: 9.8 X10*3/UL (ref 4.4–11.3)

## 2024-05-13 PROCEDURE — 85652 RBC SED RATE AUTOMATED: CPT

## 2024-05-13 PROCEDURE — 36415 COLL VENOUS BLD VENIPUNCTURE: CPT

## 2024-05-13 PROCEDURE — 99213 OFFICE O/P EST LOW 20 MIN: CPT

## 2024-05-13 PROCEDURE — 85027 COMPLETE CBC AUTOMATED: CPT

## 2024-05-13 PROCEDURE — 86140 C-REACTIVE PROTEIN: CPT

## 2024-05-13 PROCEDURE — 80053 COMPREHEN METABOLIC PANEL: CPT

## 2024-05-13 ASSESSMENT — ENCOUNTER SYMPTOMS
CHILLS: 0
NAUSEA: 0
VOMITING: 0
BLOOD IN STOOL: 0
ABDOMINAL DISTENTION: 0
APPETITE CHANGE: 0
FATIGUE: 0
RECTAL PAIN: 0
TROUBLE SWALLOWING: 0
SHORTNESS OF BREATH: 0
ANAL BLEEDING: 0
FEVER: 0
COUGH: 0
ABDOMINAL PAIN: 0
CONSTIPATION: 0
DIARRHEA: 1

## 2024-05-13 NOTE — ASSESSMENT & PLAN NOTE
Patient appears responsive to current therapy  -Continue Stelara  -CBC, CMP, CRP, ESR, fecal calprotectin ordered  -Plan for colonoscopy fall 2024 with follow-up in the office afterwards

## 2024-05-13 NOTE — PROGRESS NOTES
Subjective     History of Present Illness:   Homar Blackman is a 33 y.o. male with PMHx of chronic diarrhea, ulcerative pancolitis who presents to GI clinic for follow-up  Last seen 12/2023 for UC pancolitis.  Failed mesalamine, start Stelara    Today, patient received Stelara infusion and one injection so far.  Feeling slightly better.  First stool of the day is more solid now, second 2 are loose.  Stools are brown and nonbloody.  Abdominal pain is improving.  Denies any other GI complaints today.    Social ETOH, denies smoking,  chews tobacco, denies marijuana  Denies fxh GI cancer or IBD  Abdominal Surgeries: denies     Last colonoscopy 8/2023 Dr. Packer: Congested erythematous eroded and granular mucosa entire colon, hemorrhoids.  Consistent with mild to moderate ulcerative pancolitis.  Biopsies: Active chronic colitis.  Prescribed prednisone and mesalamine 4.8 g daily  Last EGD   7/2023 fecal calprotectin 881  Past Medical History  As per HPI.     Social History  he  reports that he has never smoked. His smokeless tobacco use includes chew.     Family History  his family history is not on file.     Review of Systems  Review of Systems   Constitutional:  Negative for appetite change, chills, fatigue and fever.   HENT:  Negative for trouble swallowing.    Respiratory:  Negative for cough and shortness of breath.    Gastrointestinal:  Positive for diarrhea. Negative for abdominal distention, abdominal pain, anal bleeding, blood in stool, constipation, nausea, rectal pain and vomiting.       Allergies  No Known Allergies    Medications  Current Outpatient Medications   Medication Instructions    Stelara 90 mg, subcutaneous, Every 8 weeks    ustekinumab (STELARA) 260 mg, intravenous, Once    ustekinumab (STELARA) 520 mg, intravenous, Once        Objective   Visit Vitals  Pulse 80      Physical Exam      Lab Results   Component Value Date    WBC 10.7 07/27/2023    WBC 7.0 02/04/2020    HGB 15.0 07/27/2023    HGB 16.2  02/04/2020    HCT 45.4 07/27/2023    HCT 49.8 02/04/2020     07/27/2023     02/04/2020     Lab Results   Component Value Date     07/27/2023     02/04/2020    K 4.5 07/27/2023    K 4.6 02/04/2020     (H) 07/27/2023     02/04/2020    CO2 25 07/27/2023    CO2 29 02/04/2020    BUN 14 07/27/2023    BUN 15 02/04/2020    CREATININE 1.16 07/27/2023    CREATININE 0.95 02/04/2020    CALCIUM 8.9 07/27/2023    CALCIUM 9.6 02/04/2020    PROT 7.1 07/27/2023    PROT 7.0 02/04/2020    BILITOT 0.6 07/27/2023    BILITOT 0.5 02/04/2020    ALKPHOS 67 07/27/2023    ALKPHOS 72 02/04/2020    ALT 21 07/27/2023    ALT 27 02/04/2020    AST 17 07/27/2023    AST 25 02/04/2020    GLUCOSE 89 07/27/2023    GLUCOSE 100 (H) 02/04/2020           Homar Blackman is a 33 y.o. male who presents to GI clinic for ulcerative colitis.    Ulcerative pancolitis without complication (Multi)  Patient appears responsive to current therapy  -Continue Stelara  -CBC, CMP, CRP, ESR, fecal calprotectin ordered  -Plan for colonoscopy fall 2024 with follow-up in the office afterwards           Sarah Elliott APRN-CNP

## 2024-05-13 NOTE — PATIENT INSTRUCTIONS
I recommend we do a colonoscopy in the fall to assess for endoscopic remission.  Please contact me when you're ready to schedule about 3 months prior. I would like to see you back in the office 1 month after your procedure    Please get your labs and stool test. I will notify you of results

## 2024-05-14 LAB
ALBUMIN SERPL BCP-MCNC: 4.5 G/DL (ref 3.4–5)
ALP SERPL-CCNC: 61 U/L (ref 33–120)
ALT SERPL W P-5'-P-CCNC: 77 U/L (ref 10–52)
ANION GAP SERPL CALC-SCNC: 14 MMOL/L (ref 10–20)
AST SERPL W P-5'-P-CCNC: 41 U/L (ref 9–39)
BILIRUB SERPL-MCNC: 0.6 MG/DL (ref 0–1.2)
BUN SERPL-MCNC: 19 MG/DL (ref 6–23)
CALCIUM SERPL-MCNC: 9.9 MG/DL (ref 8.6–10.6)
CHLORIDE SERPL-SCNC: 103 MMOL/L (ref 98–107)
CO2 SERPL-SCNC: 26 MMOL/L (ref 21–32)
CREAT SERPL-MCNC: 1.01 MG/DL (ref 0.5–1.3)
CRP SERPL-MCNC: 0.18 MG/DL
EGFRCR SERPLBLD CKD-EPI 2021: >90 ML/MIN/1.73M*2
GLUCOSE SERPL-MCNC: 98 MG/DL (ref 74–99)
POTASSIUM SERPL-SCNC: 4.5 MMOL/L (ref 3.5–5.3)
PROT SERPL-MCNC: 7.4 G/DL (ref 6.4–8.2)
SODIUM SERPL-SCNC: 138 MMOL/L (ref 136–145)

## 2024-05-20 ENCOUNTER — LAB (OUTPATIENT)
Dept: LAB | Facility: LAB | Age: 34
End: 2024-05-20
Payer: COMMERCIAL

## 2024-05-20 DIAGNOSIS — K51.00 ULCERATIVE PANCOLITIS WITHOUT COMPLICATION (MULTI): ICD-10-CM

## 2024-05-20 PROCEDURE — 83993 ASSAY FOR CALPROTECTIN FECAL: CPT

## 2024-05-22 LAB — CALPROTECTIN STL-MCNT: 1650 UG/G

## 2024-05-23 NOTE — RESULT ENCOUNTER NOTE
Your stool is suggestive of inflammation.  As, we discussed continue Stelara.  Please let me know if symptoms worsen for you

## 2024-06-24 ENCOUNTER — SPECIALTY PHARMACY (OUTPATIENT)
Dept: PHARMACY | Facility: CLINIC | Age: 34
End: 2024-06-24

## 2024-06-25 PROCEDURE — RXMED WILLOW AMBULATORY MEDICATION CHARGE

## 2024-06-26 ENCOUNTER — PHARMACY VISIT (OUTPATIENT)
Dept: PHARMACY | Facility: CLINIC | Age: 34
End: 2024-06-26
Payer: COMMERCIAL

## 2024-07-18 DIAGNOSIS — Z12.11 COLON CANCER SCREENING: Primary | ICD-10-CM

## 2024-07-18 RX ORDER — POLYETHYLENE GLYCOL 3350, SODIUM SULFATE ANHYDROUS, SODIUM BICARBONATE, SODIUM CHLORIDE, POTASSIUM CHLORIDE 236; 22.74; 6.74; 5.86; 2.97 G/4L; G/4L; G/4L; G/4L; G/4L
POWDER, FOR SOLUTION ORAL
Qty: 4000 ML | Refills: 0 | Status: SHIPPED | OUTPATIENT
Start: 2024-07-18

## 2024-07-18 RX ORDER — SOD SULF/POT CHLORIDE/MAG SULF 1.479 G
TABLET ORAL
Qty: 24 TABLET | Refills: 0 | Status: SHIPPED | OUTPATIENT
Start: 2024-07-18

## 2024-07-25 ENCOUNTER — SPECIALTY PHARMACY (OUTPATIENT)
Dept: PHARMACY | Facility: CLINIC | Age: 34
End: 2024-07-25

## 2024-08-12 PROCEDURE — RXMED WILLOW AMBULATORY MEDICATION CHARGE

## 2024-08-21 ENCOUNTER — PHARMACY VISIT (OUTPATIENT)
Dept: PHARMACY | Facility: CLINIC | Age: 34
End: 2024-08-21
Payer: COMMERCIAL

## 2024-09-17 ENCOUNTER — SPECIALTY PHARMACY (OUTPATIENT)
Dept: PHARMACY | Facility: CLINIC | Age: 34
End: 2024-09-17

## 2024-10-11 PROCEDURE — RXMED WILLOW AMBULATORY MEDICATION CHARGE

## 2024-10-14 ENCOUNTER — PHARMACY VISIT (OUTPATIENT)
Dept: PHARMACY | Facility: CLINIC | Age: 34
End: 2024-10-14
Payer: COMMERCIAL

## 2024-10-15 DIAGNOSIS — Z12.11 COLON CANCER SCREENING: ICD-10-CM

## 2024-10-15 RX ORDER — SOD SULF/POT CHLORIDE/MAG SULF 1.479 G
TABLET ORAL
Qty: 24 TABLET | Refills: 0 | Status: SHIPPED | OUTPATIENT
Start: 2024-10-15 | End: 2024-10-16 | Stop reason: SDUPTHER

## 2024-10-16 DIAGNOSIS — Z12.11 COLON CANCER SCREENING: ICD-10-CM

## 2024-10-16 RX ORDER — SOD SULF/POT CHLORIDE/MAG SULF 1.479 G
TABLET ORAL
Qty: 24 TABLET | Refills: 0 | Status: SHIPPED | OUTPATIENT
Start: 2024-10-16

## 2024-10-18 ENCOUNTER — APPOINTMENT (OUTPATIENT)
Dept: GASTROENTEROLOGY | Facility: EXTERNAL LOCATION | Age: 34
End: 2024-10-18
Payer: COMMERCIAL

## 2024-10-18 ENCOUNTER — LAB REQUISITION (OUTPATIENT)
Dept: LAB | Facility: HOSPITAL | Age: 34
End: 2024-10-18
Payer: COMMERCIAL

## 2024-10-18 DIAGNOSIS — K64.8 INTERNAL HEMORRHOIDS: ICD-10-CM

## 2024-10-18 DIAGNOSIS — K63.89 OTHER SPECIFIED DISEASES OF INTESTINE: Primary | ICD-10-CM

## 2024-10-18 DIAGNOSIS — K51.00 ULCERATIVE PANCOLITIS (MULTI): ICD-10-CM

## 2024-10-18 DIAGNOSIS — K51.20: ICD-10-CM

## 2024-10-18 PROCEDURE — 45380 COLONOSCOPY AND BIOPSY: CPT | Performed by: INTERNAL MEDICINE

## 2024-10-18 PROCEDURE — 88305 TISSUE EXAM BY PATHOLOGIST: CPT

## 2024-10-18 PROCEDURE — 88305 TISSUE EXAM BY PATHOLOGIST: CPT | Performed by: PATHOLOGY

## 2024-10-23 ENCOUNTER — SPECIALTY PHARMACY (OUTPATIENT)
Dept: PHARMACY | Facility: CLINIC | Age: 34
End: 2024-10-23

## 2024-10-25 LAB
LABORATORY COMMENT REPORT: NORMAL
PATH REPORT.FINAL DX SPEC: NORMAL
PATH REPORT.GROSS SPEC: NORMAL
PATH REPORT.RELEVANT HX SPEC: NORMAL
PATH REPORT.TOTAL CANCER: NORMAL

## 2024-12-02 ENCOUNTER — SPECIALTY PHARMACY (OUTPATIENT)
Dept: PHARMACY | Facility: CLINIC | Age: 34
End: 2024-12-02

## 2024-12-02 PROCEDURE — RXMED WILLOW AMBULATORY MEDICATION CHARGE

## 2024-12-03 ENCOUNTER — PHARMACY VISIT (OUTPATIENT)
Dept: PHARMACY | Facility: CLINIC | Age: 34
End: 2024-12-03
Payer: COMMERCIAL

## 2025-01-21 ENCOUNTER — TELEMEDICINE CLINICAL SUPPORT (OUTPATIENT)
Dept: PHARMACY | Facility: HOSPITAL | Age: 35
End: 2025-01-21
Payer: COMMERCIAL

## 2025-01-21 DIAGNOSIS — K51.00 ULCERATIVE PANCOLITIS WITHOUT COMPLICATION (MULTI): ICD-10-CM

## 2025-01-21 NOTE — PROGRESS NOTES
[facility] Clinical Note  Patient Reassessment     Introduction  Homar Blackman is a 34 y.o. male who is on the specialty pharmacy service for management of: Gastroenterology Core.      [facility] supplied medication: Stelara 90 mg once every 8 weeks    Duration of therapy: Maintenance    The most recent encounter visit with the referring prescriber Sarah Elliott on 5/13/2024 was reviewed.  Pharmacy will continue to collaborate in the care of this patient with the referring prescriber.    Discussion  Homar was contacted on 1/21/2025 at 1:36 PM for a pharmacy visit with encounter number [id number] from:   [facility]  [address]  [address]  [facility] [address]  Dept: [phone]  Dept Fax: [phone]  Loc: [phone]  Homar consented to a/an Telephone visit, which was performed.    Efficacy  Patient has developed new symptoms of condition: No  Patient/caregiver feels medication is affecting the disease state: working really well    Goals  Provided education on goals and possible outcomes of therapy:  Adherence with therapy  Timely completion of appropriate labs  Timely and appropriate follow up with provider  Identify and address medication interactions with presciption medications, OTC medications and supplements  Optimize or maintain quality of life  Gastroenterology: Improve gastrointestinal clinical symptoms such as abdominal pain, inflammation, diarrhea, constipation, and bleeding, Reduce frequency and urgency of bowel movements, and Allow for GI mucosal healing (observed through endoscopy and colonoscopy)   Patient status for the above goal(s): Patient is adherent and seeing improvement in clinical symptoms, not due for any follow up      Tolerance  Patient has experienced side effects from this medication: No  Changes to current therapy regimen: No    The follow-up timeline was discussed. Every person responds to and reacts to  therapy differently. Patient should be assessed for efficacy and tolerability in approximately: 3 months    Adherence  Patient Information  Informant: Self (Patient)  Demonstrates Understanding of Importance of Adherence: Yes  Does the patient have any barriers to self-administration (including physical and mental?): No  Medication Information  Medication: ustekinumab (Stelara)  Patient Reported Missed Doses in the Last 4 Weeks: 0  Estimated Medication Adherence Level: Good  Adherence Estimation Source: Claims history  Barriers to Adherence: No Problems identified   The importance of adherence was discussed and patient/caregiver was advised to take the medication as prescribed by their provider. Encouraged patient/caregiver to call physician's office or specialty pharmacy if they have a question regarding a missed dose.    General Assessment  Changes to home medications, OTCs or supplements: No  Current Outpatient Medications   Medication Sig Dispense Refill    ustekinumab (Stelara) injection Inject 1 mL (90 mg) under the skin every 8 (eight) weeks. 1 mL 6     No current facility-administered medications for this visit.     Reported new allergies: No  Reported new medical conditions: No  Additional monitoring reviewed: N/A  Is laboratory follow up needed? No    Advised to contact the pharmacy if there are any changes to the patient's medication list, including prescriptions, OTC medications, herbal products, or supplements.    Impression/Plan  This patient has not been identified as high risk due to Lack of high risk qualifiers.  The following action was taken: N/A.    QOL/Patient Satisfaction  Rate your quality of life on scale of 1-10: 8  Rate your satisfaction with  Specialty Pharmacy on scale of 1-10: 10 - Completely satisfied    Provided contact information ([phone]) for [facility] and reviewed dispensing process, refill timeline and patient management follow up. Confirmed  understanding of education conducted during assessment. All questions and concerns were addressed and patient/caregiver was encouraged to reach out for additional questions or concerns.    Based on the patient's diagnosis, medication list, progress towards goals, adherence, tolerance, and medication list, medication remains appropriate: I attest    Madeleine Pemberton, PharmD

## 2025-01-21 NOTE — PROGRESS NOTES
Reordered specialty prescription following telepharmacy pursuant to pharmacist-provider collaborative practice agreement.     Requested Prescriptions     Pending Prescriptions Disp Refills    ustekinumab (Stelara) injection 1 mL 5     Sig: Inject 1 mL (90 mg) under the skin every 8 (eight) weeks.

## 2025-01-26 PROCEDURE — RXMED WILLOW AMBULATORY MEDICATION CHARGE

## 2025-01-27 ENCOUNTER — SPECIALTY PHARMACY (OUTPATIENT)
Dept: PHARMACY | Facility: CLINIC | Age: 35
End: 2025-01-27

## 2025-01-29 ENCOUNTER — PHARMACY VISIT (OUTPATIENT)
Dept: PHARMACY | Facility: CLINIC | Age: 35
End: 2025-01-29
Payer: COMMERCIAL

## 2025-03-04 ENCOUNTER — SPECIALTY PHARMACY (OUTPATIENT)
Dept: PHARMACY | Facility: CLINIC | Age: 35
End: 2025-03-04

## 2025-03-12 ENCOUNTER — APPOINTMENT (OUTPATIENT)
Dept: GASTROENTEROLOGY | Facility: CLINIC | Age: 35
End: 2025-03-12
Payer: COMMERCIAL

## 2025-03-12 VITALS — HEART RATE: 91 BPM | WEIGHT: 293 LBS | OXYGEN SATURATION: 97 % | HEIGHT: 74 IN | BODY MASS INDEX: 37.6 KG/M2

## 2025-03-12 DIAGNOSIS — K58.0 IRRITABLE BOWEL SYNDROME WITH DIARRHEA: ICD-10-CM

## 2025-03-12 DIAGNOSIS — K51.00 ULCERATIVE PANCOLITIS WITHOUT COMPLICATION (MULTI): Primary | ICD-10-CM

## 2025-03-12 PROCEDURE — 3008F BODY MASS INDEX DOCD: CPT | Performed by: INTERNAL MEDICINE

## 2025-03-12 PROCEDURE — 99214 OFFICE O/P EST MOD 30 MIN: CPT | Performed by: INTERNAL MEDICINE

## 2025-03-12 RX ORDER — DESIPRAMINE HYDROCHLORIDE 10 MG/1
10 TABLET ORAL NIGHTLY
Qty: 90 TABLET | Refills: 2 | Status: SHIPPED | OUTPATIENT
Start: 2025-03-12 | End: 2025-03-12 | Stop reason: SDUPTHER

## 2025-03-12 RX ORDER — DESIPRAMINE HYDROCHLORIDE 10 MG/1
10 TABLET ORAL NIGHTLY
Qty: 90 TABLET | Refills: 2 | Status: SHIPPED | OUTPATIENT
Start: 2025-03-12 | End: 2026-03-12

## 2025-03-12 NOTE — PROGRESS NOTES
REASON FOR VISIT: Follow-up ulcerative colitis    HPI:  Homar Blackman is a 34 y.o. male with a past medical history of ulcerative pancolitis here for follow-up.  Currently managed with Stelara which he has been on for about 1 year.  Significant proven overall no diarrhea symptoms.  Describes morning urgency and diarrhea that then resolves throughout the day.  Has diarrhea about 5 days a week only in the morning.  No nocturnal symptoms.  No rectal bleeding.  Colonoscopy 5 months ago showed significant improvement with minimal erythema in the colon with biopsy showing active colitis.  Has not had any rectal bleeding.          PRIOR ENDOSCOPY    PAST MEDICAL HISTORY  No past medical history on file.    PAST SURGICAL HISTORY  Past Surgical History:   Procedure Laterality Date    OTHER SURGICAL HISTORY  02/03/2020    Tonsillectomy       FAMILY HISTORY  No family history on file.    SOCIAL HISTORY  Social History     Tobacco Use    Smoking status: Never    Smokeless tobacco: Current     Types: Chew   Substance Use Topics    Alcohol use: Not on file       REVIEW OF SYSTEMS  CONSTITUTIONAL: negative for fever, chills, fatigue, or unintentional weight loss,   HEENT: negative for icteric sclera, eye pain/redness, or changes in vision/hearing  RESPIRATORY: negative for cough, hemoptysis, wheezing, orthopnea, or dyspnea on exertion  CARDIOVASCULAR: negative for chest pain, palpitations, or syncope   GASTROINTESTINAL: as noted per HPI  GENITOURINARY: negative for dysuria, polyuria, incontinence, or hematuria  MUSCULOSKELETAL: negative for arthralgia, myalgia, or joint swelling/stiffness   INTEGUMENTARY/SKIN: negative jaundice, rash, or skin lesion  HEMATOLOGIC/LYMPHATIC: negative for prolonged bleeding, easy bruising, or swollen lymph nodes  ENDOCRINE: negative for cold/heat intolerance, polydipsia, polyuria, or goiter  NEUROLOGIC: negative for headaches, dizziness, tremor, or gait abnormality  PSYCHIATRIC: negative for  "anxiety, depression, personality changes, or sleep disturbances      A 10 point review of systems was completed and was otherwise negative.    ALLERGIES  No Known Allergies    MEDICATIONS  Current Outpatient Medications   Medication Sig Dispense Refill    desipramine (Norpramin) 10 mg tablet Take 1 tablet (10 mg) by mouth once daily at bedtime. 90 tablet 2    ustekinumab (Stelara) injection Inject 1 mL (90 mg) under the skin every 8 (eight) weeks. 1 mL 5     No current facility-administered medications for this visit.       VITALS  Pulse 91   Ht 1.88 m (6' 2\")   Wt 133 kg (293 lb)   SpO2 97%   BMI 37.62 kg/m²      PHYSICAL EXAM  Alert and oriented in no acute distress    ASSESSMENT/ PLAN  Patient with ulcerative pancolitis much improved on colonoscopy several months ago.  On Stelara for about 1 year now.  Having some morning urgency with bowel movements with loose stools about 5 days a week no rectal bleeding.  Does not occur throughout the rest of the day or any nocturnal symptoms.  Suspect some underlying IBS with diarrhea predominant symptoms.  Will start on low-dose desipramine.  Will continue Stelara.  I will check labs including CBC, comprehensive panel and TB testing.  Will also check fecal calprotectin and CRP.  I will see him back in about 3 to 4 months.  Will plan on repeating surveillance colonoscopy in about 6 months.  He is in agreement with the plan.        Signature: Naren Packer MD    Date: 3/12/2025  Time: 3:11 PM    "

## 2025-03-14 LAB
ALBUMIN SERPL-MCNC: 4.4 G/DL (ref 3.6–5.1)
ALP SERPL-CCNC: 78 U/L (ref 36–130)
ALT SERPL-CCNC: 47 U/L (ref 9–46)
ANION GAP SERPL CALCULATED.4IONS-SCNC: 11 MMOL/L (CALC) (ref 7–17)
AST SERPL-CCNC: 29 U/L (ref 10–40)
BASOPHILS # BLD AUTO: 60 CELLS/UL (ref 0–200)
BASOPHILS NFR BLD AUTO: 0.8 %
BILIRUB SERPL-MCNC: 0.4 MG/DL (ref 0.2–1.2)
BUN SERPL-MCNC: 13 MG/DL (ref 7–25)
CALCIUM SERPL-MCNC: 9.3 MG/DL (ref 8.6–10.3)
CHLORIDE SERPL-SCNC: 104 MMOL/L (ref 98–110)
CO2 SERPL-SCNC: 23 MMOL/L (ref 20–32)
CREAT SERPL-MCNC: 1.07 MG/DL (ref 0.6–1.26)
CRP SERPL-MCNC: <3 MG/L
EGFRCR SERPLBLD CKD-EPI 2021: 93 ML/MIN/1.73M2
EOSINOPHIL # BLD AUTO: 368 CELLS/UL (ref 15–500)
EOSINOPHIL NFR BLD AUTO: 4.9 %
ERYTHROCYTE [DISTWIDTH] IN BLOOD BY AUTOMATED COUNT: 13 % (ref 11–15)
GLUCOSE SERPL-MCNC: 170 MG/DL (ref 65–99)
HCT VFR BLD AUTO: 46 % (ref 38.5–50)
HGB BLD-MCNC: 15.7 G/DL (ref 13.2–17.1)
IGNF NEG CNTRL BLD: NORMAL
LYMPHOCYTES # BLD AUTO: 2475 CELLS/UL (ref 850–3900)
LYMPHOCYTES NFR BLD AUTO: 33 %
M TB IFN-G BLD-IMP: NEGATIVE
MCH RBC QN AUTO: 33.1 PG (ref 27–33)
MCHC RBC AUTO-ENTMCNC: 34.1 G/DL (ref 32–36)
MCV RBC AUTO: 97 FL (ref 80–100)
MITOGEN IGNF.SPOT COUNT BLD: NORMAL
MONOCYTES # BLD AUTO: 555 CELLS/UL (ref 200–950)
MONOCYTES NFR BLD AUTO: 7.4 %
NEUTROPHILS # BLD AUTO: 4043 CELLS/UL (ref 1500–7800)
NEUTROPHILS NFR BLD AUTO: 53.9 %
PLATELET # BLD AUTO: 231 THOUSAND/UL (ref 140–400)
PMV BLD REES-ECKER: 11 FL (ref 7.5–12.5)
POTASSIUM SERPL-SCNC: 3.9 MMOL/L (ref 3.5–5.3)
PROT SERPL-MCNC: 7.1 G/DL (ref 6.1–8.1)
QUEST PANEL A SPOT COUNT: 0
QUEST PANEL B SPOT COUNT: 0
RBC # BLD AUTO: 4.74 MILLION/UL (ref 4.2–5.8)
SODIUM SERPL-SCNC: 138 MMOL/L (ref 135–146)
WBC # BLD AUTO: 7.5 THOUSAND/UL (ref 3.8–10.8)

## 2025-03-24 ENCOUNTER — SPECIALTY PHARMACY (OUTPATIENT)
Dept: PHARMACY | Facility: CLINIC | Age: 35
End: 2025-03-24

## 2025-03-31 ENCOUNTER — SPECIALTY PHARMACY (OUTPATIENT)
Dept: PHARMACY | Facility: CLINIC | Age: 35
End: 2025-03-31

## 2025-03-31 PROCEDURE — RXMED WILLOW AMBULATORY MEDICATION CHARGE

## 2025-04-07 ENCOUNTER — PHARMACY VISIT (OUTPATIENT)
Dept: PHARMACY | Facility: CLINIC | Age: 35
End: 2025-04-07
Payer: COMMERCIAL

## 2025-05-06 ENCOUNTER — SPECIALTY PHARMACY (OUTPATIENT)
Dept: PHARMACY | Facility: CLINIC | Age: 35
End: 2025-05-06

## 2025-05-06 NOTE — PROGRESS NOTES
Unable to contact patient to conduct reassessment. Will reattempt to contact patient in 3 months per  Specialty Pharmacy Patient Management Program policy.

## 2025-05-30 PROCEDURE — RXMED WILLOW AMBULATORY MEDICATION CHARGE

## 2025-05-31 ENCOUNTER — SPECIALTY PHARMACY (OUTPATIENT)
Dept: PHARMACY | Facility: CLINIC | Age: 35
End: 2025-05-31

## 2025-06-02 ENCOUNTER — PHARMACY VISIT (OUTPATIENT)
Dept: PHARMACY | Facility: CLINIC | Age: 35
End: 2025-06-02
Payer: COMMERCIAL

## 2025-07-11 ENCOUNTER — APPOINTMENT (OUTPATIENT)
Dept: GASTROENTEROLOGY | Facility: CLINIC | Age: 35
End: 2025-07-11
Payer: COMMERCIAL

## 2025-08-04 ENCOUNTER — APPOINTMENT (OUTPATIENT)
Dept: GASTROENTEROLOGY | Facility: CLINIC | Age: 35
End: 2025-08-04
Payer: COMMERCIAL

## 2025-08-04 VITALS — HEIGHT: 74 IN | BODY MASS INDEX: 35.94 KG/M2 | WEIGHT: 280 LBS | HEART RATE: 80 BPM

## 2025-08-04 DIAGNOSIS — K51.00 ULCERATIVE PANCOLITIS WITHOUT COMPLICATION (MULTI): Primary | ICD-10-CM

## 2025-08-04 DIAGNOSIS — K58.0 IRRITABLE BOWEL SYNDROME WITH DIARRHEA: ICD-10-CM

## 2025-08-04 PROCEDURE — 99213 OFFICE O/P EST LOW 20 MIN: CPT | Performed by: INTERNAL MEDICINE

## 2025-08-04 PROCEDURE — 3008F BODY MASS INDEX DOCD: CPT | Performed by: INTERNAL MEDICINE

## 2025-08-04 RX ORDER — SOD SULF/POT CHLORIDE/MAG SULF 1.479 G
TABLET ORAL
Qty: 24 TABLET | Refills: 0 | Status: SHIPPED | OUTPATIENT
Start: 2025-08-04

## 2025-08-04 NOTE — PROGRESS NOTES
REASON FOR VISIT: Follow-up ulcerative colitis    HPI:  Homar Blackman is a 35 y.o. male with ulcerative pancolitis has been now on Stelara for biologic therapy over the past 15 months.  Overall doing better.  At last visit added desipramine for suspected diarrhea predominant IBS that he was having some morning urgency and loose stools.  He states that with desipramine does have improved.  CRP normal several months ago.  Has not had any rectal bleeding.  Generally bowel movements formed.  Has been compliant with his Stelara.          PRIOR ENDOSCOPY    PAST MEDICAL HISTORY  Medical History[1]    PAST SURGICAL HISTORY  Surgical History[2]    FAMILY HISTORY  Family History[3]    SOCIAL HISTORY  Social History     Tobacco Use    Smoking status: Every Day    Smokeless tobacco: Current     Types: Chew   Substance Use Topics    Alcohol use: Yes     Alcohol/week: 12.0 standard drinks of alcohol     Types: 12 Cans of beer per week       REVIEW OF SYSTEMS  CONSTITUTIONAL: negative for fever, chills, fatigue, or unintentional weight loss,   HEENT: negative for icteric sclera, eye pain/redness, or changes in vision/hearing  RESPIRATORY: negative for cough, hemoptysis, wheezing, orthopnea, or dyspnea on exertion  CARDIOVASCULAR: negative for chest pain, palpitations, or syncope   GASTROINTESTINAL: as noted per HPI  GENITOURINARY: negative for dysuria, polyuria, incontinence, or hematuria  MUSCULOSKELETAL: negative for arthralgia, myalgia, or joint swelling/stiffness   INTEGUMENTARY/SKIN: negative jaundice, rash, or skin lesion  HEMATOLOGIC/LYMPHATIC: negative for prolonged bleeding, easy bruising, or swollen lymph nodes  ENDOCRINE: negative for cold/heat intolerance, polydipsia, polyuria, or goiter  NEUROLOGIC: negative for headaches, dizziness, tremor, or gait abnormality  PSYCHIATRIC: negative for anxiety, depression, personality changes, or sleep disturbances      A 10 point review of systems was completed and was otherwise  "negative.    ALLERGIES  Allergies[4]    MEDICATIONS  Current Medications[5]    VITALS  Pulse 80   Ht 1.88 m (6' 2\")   Wt 127 kg (280 lb)   BMI 35.95 kg/m²      PHYSICAL EXAM  Alert oriented in no acute distress    ASSESSMENT/ PLAN  Patient with ulcerative pancolitis currently on therapy with Stelara.  Doing better.  Addition of desipramine for diarrhea predominant IBS has helped.  Commend surveillance colonoscopy as it has been about 1 year since his last colonoscopy to assess for mucosal healing.  He is in agreement with the plan will follow-up with me on colonoscopy.  Reviewed recent serologies from several months ago.        Signature: Naren Packer MD    Date: 8/4/2025  Time: 1:47 PM         [1]   Past Medical History:  Diagnosis Date    Ulcerative colitis    [2]   Past Surgical History:  Procedure Laterality Date    OTHER SURGICAL HISTORY  02/03/2020    Tonsillectomy   [3]   Family History  Problem Relation Name Age of Onset    Diabetes Father Haider Blackman     Heart attack Father Haider Blackman    [4] No Known Allergies  [5]   Current Outpatient Medications   Medication Sig Dispense Refill    desipramine (Norpramin) 10 mg tablet Take 1 tablet (10 mg) by mouth once daily at bedtime. 90 tablet 2    ibuprofen 800 mg tablet TAKE 1 TABLET BY MOUTH EVERY 6 HOURS WITH FOOD.      ustekinumab (Stelara) injection Inject 1 mL (90 mg) under the skin every 8 (eight) weeks. 1 mL 5    sod sulf-pot chloride-mag sulf (Sutab) 1.479-0.188- 0.225 gram tablet tablet Starting at 6pm open one bottle of pills and fill glass provided with water and drink according to prep sheet. Start the 2nd bottle with directions on prep sheet 5 hours before procedure time 24 tablet 0     No current facility-administered medications for this visit.     "

## 2025-08-06 ENCOUNTER — SPECIALTY PHARMACY (OUTPATIENT)
Dept: PHARMACY | Facility: CLINIC | Age: 35
End: 2025-08-06

## 2025-08-06 PROCEDURE — RXMED WILLOW AMBULATORY MEDICATION CHARGE

## 2025-08-11 ENCOUNTER — SPECIALTY PHARMACY (OUTPATIENT)
Dept: PHARMACY | Facility: CLINIC | Age: 35
End: 2025-08-11

## 2025-08-12 ENCOUNTER — PHARMACY VISIT (OUTPATIENT)
Dept: PHARMACY | Facility: CLINIC | Age: 35
End: 2025-08-12
Payer: COMMERCIAL

## 2025-08-22 ENCOUNTER — APPOINTMENT (OUTPATIENT)
Dept: GASTROENTEROLOGY | Facility: CLINIC | Age: 35
End: 2025-08-22
Payer: COMMERCIAL

## 2025-09-04 ENCOUNTER — SPECIALTY PHARMACY (OUTPATIENT)
Dept: PHARMACY | Facility: CLINIC | Age: 35
End: 2025-09-04

## 2025-09-15 ENCOUNTER — APPOINTMENT (OUTPATIENT)
Dept: GASTROENTEROLOGY | Facility: EXTERNAL LOCATION | Age: 35
End: 2025-09-15
Payer: COMMERCIAL